# Patient Record
Sex: FEMALE | Race: WHITE | URBAN - METROPOLITAN AREA
[De-identification: names, ages, dates, MRNs, and addresses within clinical notes are randomized per-mention and may not be internally consistent; named-entity substitution may affect disease eponyms.]

---

## 2021-09-14 ENCOUNTER — EMERGENCY (EMERGENCY)
Facility: HOSPITAL | Age: 71
LOS: 0 days | Discharge: HOME | End: 2021-09-15
Attending: EMERGENCY MEDICINE | Admitting: STUDENT IN AN ORGANIZED HEALTH CARE EDUCATION/TRAINING PROGRAM
Payer: MEDICARE

## 2021-09-14 VITALS
SYSTOLIC BLOOD PRESSURE: 162 MMHG | WEIGHT: 104.06 LBS | OXYGEN SATURATION: 98 % | HEART RATE: 87 BPM | RESPIRATION RATE: 17 BRPM | DIASTOLIC BLOOD PRESSURE: 86 MMHG | TEMPERATURE: 98 F | HEIGHT: 64 IN

## 2021-09-14 DIAGNOSIS — I10 ESSENTIAL (PRIMARY) HYPERTENSION: ICD-10-CM

## 2021-09-14 DIAGNOSIS — E78.5 HYPERLIPIDEMIA, UNSPECIFIED: ICD-10-CM

## 2021-09-14 DIAGNOSIS — R94.31 ABNORMAL ELECTROCARDIOGRAM [ECG] [EKG]: ICD-10-CM

## 2021-09-14 DIAGNOSIS — I83.90 ASYMPTOMATIC VARICOSE VEINS OF UNSPECIFIED LOWER EXTREMITY: ICD-10-CM

## 2021-09-14 DIAGNOSIS — R07.89 OTHER CHEST PAIN: ICD-10-CM

## 2021-09-14 DIAGNOSIS — R07.9 CHEST PAIN, UNSPECIFIED: ICD-10-CM

## 2021-09-14 DIAGNOSIS — I49.8 OTHER SPECIFIED CARDIAC ARRHYTHMIAS: ICD-10-CM

## 2021-09-14 DIAGNOSIS — R06.02 SHORTNESS OF BREATH: ICD-10-CM

## 2021-09-14 DIAGNOSIS — Z20.822 CONTACT WITH AND (SUSPECTED) EXPOSURE TO COVID-19: ICD-10-CM

## 2021-09-14 LAB
ALBUMIN SERPL ELPH-MCNC: 3.5 G/DL — SIGNIFICANT CHANGE UP (ref 3.3–5)
ALP SERPL-CCNC: 102 U/L — SIGNIFICANT CHANGE UP (ref 40–120)
ALT FLD-CCNC: 32 U/L — SIGNIFICANT CHANGE UP (ref 12–78)
ANION GAP SERPL CALC-SCNC: 1 MMOL/L — LOW (ref 5–17)
AST SERPL-CCNC: 30 U/L — SIGNIFICANT CHANGE UP (ref 15–37)
BASOPHILS # BLD AUTO: 0.01 K/UL — SIGNIFICANT CHANGE UP (ref 0–0.2)
BASOPHILS NFR BLD AUTO: 0.2 % — SIGNIFICANT CHANGE UP (ref 0–2)
BILIRUB SERPL-MCNC: 0.3 MG/DL — SIGNIFICANT CHANGE UP (ref 0.2–1.2)
BUN SERPL-MCNC: 16 MG/DL — SIGNIFICANT CHANGE UP (ref 7–23)
CALCIUM SERPL-MCNC: 8.4 MG/DL — LOW (ref 8.5–10.1)
CHLORIDE SERPL-SCNC: 107 MMOL/L — SIGNIFICANT CHANGE UP (ref 96–108)
CHOLEST SERPL-MCNC: 168 MG/DL — SIGNIFICANT CHANGE UP
CO2 SERPL-SCNC: 30 MMOL/L — SIGNIFICANT CHANGE UP (ref 22–31)
CREAT SERPL-MCNC: 0.65 MG/DL — SIGNIFICANT CHANGE UP (ref 0.5–1.3)
D DIMER BLD IA.RAPID-MCNC: <150 NG/ML DDU — SIGNIFICANT CHANGE UP
EOSINOPHIL # BLD AUTO: 0.2 K/UL — SIGNIFICANT CHANGE UP (ref 0–0.5)
EOSINOPHIL NFR BLD AUTO: 4.4 % — SIGNIFICANT CHANGE UP (ref 0–6)
GLUCOSE SERPL-MCNC: 100 MG/DL — HIGH (ref 70–99)
HCT VFR BLD CALC: 44.4 % — SIGNIFICANT CHANGE UP (ref 34.5–45)
HDLC SERPL-MCNC: 63 MG/DL — SIGNIFICANT CHANGE UP
HGB BLD-MCNC: 14.1 G/DL — SIGNIFICANT CHANGE UP (ref 11.5–15.5)
IMM GRANULOCYTES NFR BLD AUTO: 0.4 % — SIGNIFICANT CHANGE UP (ref 0–1.5)
LIPID PNL WITH DIRECT LDL SERPL: 94 MG/DL — SIGNIFICANT CHANGE UP
LYMPHOCYTES # BLD AUTO: 1.81 K/UL — SIGNIFICANT CHANGE UP (ref 1–3.3)
LYMPHOCYTES # BLD AUTO: 40.2 % — SIGNIFICANT CHANGE UP (ref 13–44)
MCHC RBC-ENTMCNC: 28.7 PG — SIGNIFICANT CHANGE UP (ref 27–34)
MCHC RBC-ENTMCNC: 31.8 GM/DL — LOW (ref 32–36)
MCV RBC AUTO: 90.4 FL — SIGNIFICANT CHANGE UP (ref 80–100)
MONOCYTES # BLD AUTO: 0.28 K/UL — SIGNIFICANT CHANGE UP (ref 0–0.9)
MONOCYTES NFR BLD AUTO: 6.2 % — SIGNIFICANT CHANGE UP (ref 2–14)
NEUTROPHILS # BLD AUTO: 2.18 K/UL — SIGNIFICANT CHANGE UP (ref 1.8–7.4)
NEUTROPHILS NFR BLD AUTO: 48.6 % — SIGNIFICANT CHANGE UP (ref 43–77)
NON HDL CHOLESTEROL: 105 MG/DL — SIGNIFICANT CHANGE UP
NT-PROBNP SERPL-SCNC: 86 PG/ML — SIGNIFICANT CHANGE UP (ref 0–125)
PLATELET # BLD AUTO: 206 K/UL — SIGNIFICANT CHANGE UP (ref 150–400)
POTASSIUM SERPL-MCNC: 4.2 MMOL/L — SIGNIFICANT CHANGE UP (ref 3.5–5.3)
POTASSIUM SERPL-SCNC: 4.2 MMOL/L — SIGNIFICANT CHANGE UP (ref 3.5–5.3)
PROT SERPL-MCNC: 6.6 GM/DL — SIGNIFICANT CHANGE UP (ref 6–8.3)
RAPID RVP RESULT: SIGNIFICANT CHANGE UP
RBC # BLD: 4.91 M/UL — SIGNIFICANT CHANGE UP (ref 3.8–5.2)
RBC # FLD: 13.6 % — SIGNIFICANT CHANGE UP (ref 10.3–14.5)
SARS-COV-2 RNA SPEC QL NAA+PROBE: SIGNIFICANT CHANGE UP
SODIUM SERPL-SCNC: 138 MMOL/L — SIGNIFICANT CHANGE UP (ref 135–145)
TRIGL SERPL-MCNC: 56 MG/DL — SIGNIFICANT CHANGE UP
TROPONIN I SERPL-MCNC: <0.015 NG/ML — SIGNIFICANT CHANGE UP (ref 0.01–0.04)
TROPONIN I SERPL-MCNC: <0.015 NG/ML — SIGNIFICANT CHANGE UP (ref 0.01–0.04)
WBC # BLD: 4.5 K/UL — SIGNIFICANT CHANGE UP (ref 3.8–10.5)
WBC # FLD AUTO: 4.5 K/UL — SIGNIFICANT CHANGE UP (ref 3.8–10.5)

## 2021-09-14 PROCEDURE — 71045 X-RAY EXAM CHEST 1 VIEW: CPT | Mod: 26

## 2021-09-14 PROCEDURE — 80061 LIPID PANEL: CPT

## 2021-09-14 PROCEDURE — 83880 ASSAY OF NATRIURETIC PEPTIDE: CPT

## 2021-09-14 PROCEDURE — 83036 HEMOGLOBIN GLYCOSYLATED A1C: CPT

## 2021-09-14 PROCEDURE — 93005 ELECTROCARDIOGRAM TRACING: CPT

## 2021-09-14 PROCEDURE — 96374 THER/PROPH/DIAG INJ IV PUSH: CPT | Mod: XU

## 2021-09-14 PROCEDURE — G0378: CPT

## 2021-09-14 PROCEDURE — 0225U NFCT DS DNA&RNA 21 SARSCOV2: CPT

## 2021-09-14 PROCEDURE — 99285 EMERGENCY DEPT VISIT HI MDM: CPT | Mod: CS

## 2021-09-14 PROCEDURE — 71045 X-RAY EXAM CHEST 1 VIEW: CPT

## 2021-09-14 PROCEDURE — 78452 HT MUSCLE IMAGE SPECT MULT: CPT

## 2021-09-14 PROCEDURE — 93306 TTE W/DOPPLER COMPLETE: CPT | Mod: 26

## 2021-09-14 PROCEDURE — 80053 COMPREHEN METABOLIC PANEL: CPT

## 2021-09-14 PROCEDURE — 99284 EMERGENCY DEPT VISIT MOD MDM: CPT | Mod: 25

## 2021-09-14 PROCEDURE — 84443 ASSAY THYROID STIM HORMONE: CPT

## 2021-09-14 PROCEDURE — 84484 ASSAY OF TROPONIN QUANT: CPT

## 2021-09-14 PROCEDURE — 93306 TTE W/DOPPLER COMPLETE: CPT

## 2021-09-14 PROCEDURE — 86803 HEPATITIS C AB TEST: CPT

## 2021-09-14 PROCEDURE — 85379 FIBRIN DEGRADATION QUANT: CPT

## 2021-09-14 PROCEDURE — A9500: CPT

## 2021-09-14 PROCEDURE — 85025 COMPLETE CBC W/AUTO DIFF WBC: CPT

## 2021-09-14 PROCEDURE — 86769 SARS-COV-2 COVID-19 ANTIBODY: CPT

## 2021-09-14 PROCEDURE — 36415 COLL VENOUS BLD VENIPUNCTURE: CPT

## 2021-09-14 RX ORDER — INFLUENZA VIRUS VACCINE 15; 15; 15; 15 UG/.5ML; UG/.5ML; UG/.5ML; UG/.5ML
0.5 SUSPENSION INTRAMUSCULAR ONCE
Refills: 0 | Status: DISCONTINUED | OUTPATIENT
Start: 2021-09-14 | End: 2021-09-15

## 2021-09-14 RX ORDER — METOPROLOL TARTRATE 50 MG
25 TABLET ORAL EVERY 12 HOURS
Refills: 0 | Status: DISCONTINUED | OUTPATIENT
Start: 2021-09-14 | End: 2021-09-15

## 2021-09-14 RX ORDER — ENOXAPARIN SODIUM 100 MG/ML
40 INJECTION SUBCUTANEOUS DAILY
Refills: 0 | Status: DISCONTINUED | OUTPATIENT
Start: 2021-09-14 | End: 2021-09-15

## 2021-09-14 RX ORDER — ASPIRIN/CALCIUM CARB/MAGNESIUM 324 MG
81 TABLET ORAL DAILY
Refills: 0 | Status: DISCONTINUED | OUTPATIENT
Start: 2021-09-15 | End: 2021-09-15

## 2021-09-14 RX ORDER — ASPIRIN/CALCIUM CARB/MAGNESIUM 324 MG
325 TABLET ORAL ONCE
Refills: 0 | Status: COMPLETED | OUTPATIENT
Start: 2021-09-14 | End: 2021-09-14

## 2021-09-14 RX ORDER — ATORVASTATIN CALCIUM 80 MG/1
20 TABLET, FILM COATED ORAL AT BEDTIME
Refills: 0 | Status: DISCONTINUED | OUTPATIENT
Start: 2021-09-14 | End: 2021-09-15

## 2021-09-14 RX ADMIN — ENOXAPARIN SODIUM 40 MILLIGRAM(S): 100 INJECTION SUBCUTANEOUS at 11:05

## 2021-09-14 RX ADMIN — Medication 25 MILLIGRAM(S): at 21:52

## 2021-09-14 RX ADMIN — Medication 25 MILLIGRAM(S): at 11:05

## 2021-09-14 RX ADMIN — ATORVASTATIN CALCIUM 20 MILLIGRAM(S): 80 TABLET, FILM COATED ORAL at 21:52

## 2021-09-14 RX ADMIN — Medication 325 MILLIGRAM(S): at 04:54

## 2021-09-14 NOTE — H&P ADULT - HISTORY OF PRESENT ILLNESS
70/F with PMHX of HLD, varicose veins, was brought to the  ED for c/o sudden onset of left sided chest  pain that woke her from sleep. Chest pain felt like heavy pressure  pain started anteriorly and then wrapped around under left armpit, better now. Non tender.   She denies any previous episodes of cp.  Her last stress was over an yr ago  No known CAD/stents/MI./CVA/TIA. No c/o fever, HA, sob, n/v/d/abd pain.     Elevated SBP in , 162, 164    1st trop neg    D dimers neg

## 2021-09-14 NOTE — ED ADULT NURSE REASSESSMENT NOTE - NS ED NURSE REASSESS COMMENT FT1
pt aaox4, pt resting comfortably on stretcher, no s/sx of distress noted, vss, afebrile, nsr 60s on monitor, report given to Dewey RN, awaiting for transporter

## 2021-09-14 NOTE — PHARMACOTHERAPY INTERVENTION NOTE - COMMENTS
Medications and allergies have been reviewed and verified with the patient and in correspondence with Dr. Carson medication history profile.

## 2021-09-14 NOTE — ED PROVIDER NOTE - OBJECTIVE STATEMENT
71 y/o female in ED c/o sudden onset of left sided chest pressure pain that awoke her from sleep.   pt denies any previous episodes of cp.   states last stress over 1 yr ago at [facility].   pt denies any cardiac history.   pt denies any fever, HA, sob, n/v/d/abd pain.   pt states pain started anterior and then wrapped around to under armpit.

## 2021-09-14 NOTE — ED ADULT TRIAGE NOTE - CHIEF COMPLAINT QUOTE
Pt presents to the ED c/o L sided nonradiating chest pain that woke her up from her sleep. States she is having trouble taking a deep breath. Denies dizziness, nausea, vomiting, swelling to lower extremities. Sent in for stat ekg.

## 2021-09-14 NOTE — ED PROVIDER NOTE - PROGRESS NOTE DETAILS
results noted.   will admit for further cardiac w/u.   case d/w Dominga and will admit with bridge and SIMÓN

## 2021-09-14 NOTE — ED ADULT NURSE NOTE - OBJECTIVE STATEMENT
tylenol pt presents ambulatory to ER c/o l sided chest pressure/discomfort. non-radiating. no aggravating or alleviating factors. endorses hx HLD on lipitor, palpitations on propanolol PRN last took today. denies other cardiac history. spo2 >95% on room air. bedside cardiac monitor reading NSR with occasional PVCs. 20# IV placed to L AC. bloodwork drawn and sent to lab.

## 2021-09-14 NOTE — H&P ADULT - ASSESSMENT
70/F with PMHX of HLD, varicose veins, was brought to the  ED for c/o sudden onset of left sided chest  pain that woke her from sleep. Chest pain felt like heavy pressure  pain started anteriorly and then wrapped around under left armpit, better now. Non tender.   She denies any previous episodes of cp.  Her last stress was over an yr ago  No known CAD/stents/MI./CVA/TIA. No c/o fever, HA, sob, n/v/d/abd pain.     1st trop neg    D dimers neg    Pt admitted with     1) Chest Pain + abn EKG: neg d dimers  JOHANA  observe on tele  serial cardiac enz  asa daily  lipid panel  control BP   add BB  echo  cardio consult for stress test vs cath    2) New Onset HTN: , 162, 161  start metoprolol 25 mg bid, titrate  low sat diet    3) HLD: on statin    4) DVT PPX: lovenox    poc discussed with pt, team.

## 2021-09-14 NOTE — H&P ADULT - NSHPPHYSICALEXAM_GEN_ALL_CORE
PHYSICAL EXAM:    Daily Height in cm: 162.56 (14 Sep 2021 02:56)     Vital Signs Last 24 Hrs  T(C): 37.1 (14 Sep 2021 06:52), Max: 37.1 (14 Sep 2021 06:52)  T(F): 98.7 (14 Sep 2021 06:52), Max: 98.7 (14 Sep 2021 06:52)  HR: 74 (14 Sep 2021 08:44) (74 - 87)  BP: 164/88 (14 Sep 2021 08:44) (161/84 - 164/88)  BP(mean): 107 (14 Sep 2021 06:52) (107 - 107)  ABP: --  ABP(mean): --  RR: 18 (14 Sep 2021 08:44) (17 - 18)  SpO2: 100% (14 Sep 2021 08:44) (98% - 100%)      Constitutional: Well appearing  HEENT: Atraumatic, ROBBY, Normal, No congestion  Respiratory: Breath Sounds normal, no rhonchi/wheeze  Cardiovascular: N S1S2;  Gastrointestinal: Abdomen soft, non tender, Bowel Sounds present  Extremities: No edema, peripheral pulses present  Neurological: AAO x 3, no gross focal motor deficits  Skin: Non cellulitic, no rash, ulcers  Lymph Nodes: No lymphadenopathy noted  Back: No CVA tenderness   Musculoskeletal: non tender  Breasts: Deferred  Genitourinary: deferred  Rectal: Deferred

## 2021-09-15 ENCOUNTER — TRANSCRIPTION ENCOUNTER (OUTPATIENT)
Age: 71
End: 2021-09-15

## 2021-09-15 VITALS — SYSTOLIC BLOOD PRESSURE: 131 MMHG | HEART RATE: 84 BPM | DIASTOLIC BLOOD PRESSURE: 74 MMHG

## 2021-09-15 LAB
A1C WITH ESTIMATED AVERAGE GLUCOSE RESULT: 5.4 % — SIGNIFICANT CHANGE UP (ref 4–5.6)
COVID-19 SPIKE DOMAIN AB INTERP: POSITIVE
COVID-19 SPIKE DOMAIN ANTIBODY RESULT: >250 U/ML — HIGH
ESTIMATED AVERAGE GLUCOSE: 108 MG/DL — SIGNIFICANT CHANGE UP (ref 68–114)
HCV AB S/CO SERPL IA: 0.14 S/CO — SIGNIFICANT CHANGE UP (ref 0–0.99)
HCV AB SERPL-IMP: SIGNIFICANT CHANGE UP
SARS-COV-2 IGG+IGM SERPL QL IA: >250 U/ML — HIGH
SARS-COV-2 IGG+IGM SERPL QL IA: POSITIVE
TSH SERPL-MCNC: 2.02 UU/ML — SIGNIFICANT CHANGE UP (ref 0.34–4.82)

## 2021-09-15 PROCEDURE — 93016 CV STRESS TEST SUPVJ ONLY: CPT

## 2021-09-15 PROCEDURE — 93018 CV STRESS TEST I&R ONLY: CPT

## 2021-09-15 PROCEDURE — 78452 HT MUSCLE IMAGE SPECT MULT: CPT | Mod: 26

## 2021-09-15 RX ORDER — ASPIRIN/CALCIUM CARB/MAGNESIUM 324 MG
1 TABLET ORAL
Qty: 0 | Refills: 0 | DISCHARGE
Start: 2021-09-15

## 2021-09-15 RX ORDER — METOPROLOL TARTRATE 50 MG
1 TABLET ORAL
Qty: 60 | Refills: 0
Start: 2021-09-15 | End: 2021-10-14

## 2021-09-15 RX ORDER — ROSUVASTATIN CALCIUM 5 MG/1
1 TABLET ORAL
Qty: 0 | Refills: 0 | DISCHARGE

## 2021-09-15 RX ORDER — BNT162B2 0.23 MG/2.25ML
0.3 INJECTION, SUSPENSION INTRAMUSCULAR
Qty: 0 | Refills: 0 | DISCHARGE

## 2021-09-15 RX ORDER — PROPRANOLOL HCL 160 MG
1 CAPSULE, EXTENDED RELEASE 24HR ORAL
Qty: 0 | Refills: 0 | DISCHARGE

## 2021-09-15 RX ORDER — REGADENOSON 0.08 MG/ML
0.4 INJECTION, SOLUTION INTRAVENOUS ONCE
Refills: 0 | Status: COMPLETED | OUTPATIENT
Start: 2021-09-15 | End: 2021-09-15

## 2021-09-15 RX ORDER — METOPROLOL TARTRATE 50 MG
1 TABLET ORAL
Qty: 0 | Refills: 0 | DISCHARGE
Start: 2021-09-15

## 2021-09-15 RX ADMIN — ENOXAPARIN SODIUM 40 MILLIGRAM(S): 100 INJECTION SUBCUTANEOUS at 11:08

## 2021-09-15 RX ADMIN — Medication 81 MILLIGRAM(S): at 11:08

## 2021-09-15 RX ADMIN — REGADENOSON 0.4 MILLIGRAM(S): 0.08 INJECTION, SOLUTION INTRAVENOUS at 09:50

## 2021-09-15 RX ADMIN — Medication 25 MILLIGRAM(S): at 11:08

## 2021-09-15 NOTE — DISCHARGE NOTE PROVIDER - NSDCCPCAREPLAN_GEN_ALL_CORE_FT
PRINCIPAL DISCHARGE DIAGNOSIS  Diagnosis: Chest pain  Assessment and Plan of Treatment: # Atypical chest pain  Likely chest wall skeletal muscle spasm  At present chest pain free, no SOB   EKG wih T inversion   TTE - with preserved EF - wnl  trops neg x 2  BNP - wnl  D-Dimer - wnl  Nuclear stress test done on 9/15 - normal   cont. asa, statin, BB  follow up with cardiology next week         SECONDARY DISCHARGE DIAGNOSES  Diagnosis: HTN (hypertension)  Assessment and Plan of Treatment: #HTN/Hx of paroxysmal tachycardia  - started on metoprolol tartarate 25 mg po q 12 hrs

## 2021-09-15 NOTE — CONSULT NOTE ADULT - SUBJECTIVE AND OBJECTIVE BOX
Patient is a 70y old  Female who presents with a chief complaint of cp (14 Sep 2021 08:53)  9/15/21- Cardiology Consultation: Seen due to chest pain. 70 year old woman with HLD who awoke from sleep at about 2 AM on  with severe left lateral inspiratory chest pain and shortness of breath. Spouse drove her to ED and she was admitted to telemetry. She had performed heavy housework on . The pain ended spontaneously after several hours and has not recurred. No prior history of chest pain, MI, angina, HBP, DM. Stress echo perhaps last year was normal. History of episodic tachycardia for years treated with prn use of propranolol. No tobacco use, rare alcohol. No FH of CAD.    HPI:   70/F with PMHX of HLD, varicose veins, was brought to the  ED for c/o sudden onset of left sided chest  pain that woke her from sleep. Chest pain felt like heavy pressure  pain started anteriorly and then wrapped around under left armpit, better now. Non tender.   She denies any previous episodes of cp.  Her last stress was over an yr ago  No known CAD/stents/MI./CVA/TIA. No c/o fever, HA, sob, n/v/d/abd pain. Elevated SBP in , 162, 164    1st trop neg    D dimers neg (14 Sep 2021 08:53)      PAST MEDICAL & SURGICAL HISTORY:  No pertinent past medical history      Allergies and Intolerances:        Allergies:  	No Known Allergies:     Home Medications:   * Patient Currently Takes Medications as of 14-Sep-2021 08:58 documented in Structured Notes  · 	rosuvastatin 5 mg oral tablet: Last Dose Taken:  , 1 tab(s) orally once a day  · 	propranolol 10 mg oral tablet: Last Dose Taken:  , 1 tab(s) orally once a day, As Needed  · 	Pfizer-BioNTech COVID-19 Vaccine PF 30 mcg/0.3 mL intramuscular suspension: Last Dose Taken:  , 0.3 milliliter(s) intramuscular once    MEDICATIONS  (STANDING):  aspirin enteric coated 81 milliGRAM(s) Oral daily  atorvastatin 20 milliGRAM(s) Oral at bedtime  enoxaparin Injectable 40 milliGRAM(s) SubCutaneous daily  influenza   Vaccine 0.5 milliLiter(s) IntraMuscular once  metoprolol tartrate 25 milliGRAM(s) Oral every 12 hours    MEDICATIONS  (PRN):      FAMILY HISTORY:  No pertinent family history in first degree relatives        SOCIAL HISTORY:  Tobacco-           Alcohol-              Illicit drugs-              Occupation-              Marital  status-  REVIEW OF SYSTEM:  Pertinent items are noted in HPI.    Vital Signs Last 24 Hrs  T(C): 36.4 (14 Sep 2021 20:48), Max: 37 (14 Sep 2021 16:38)  T(F): 97.6 (14 Sep 2021 20:48), Max: 98.6 (14 Sep 2021 16:38)  HR: 74 (14 Sep 2021 20:48) (66 - 74)  BP: 134/75 (14 Sep 2021 20:48) (134/75 - 164/88)  BP(mean): 98 (14 Sep 2021 16:38) (98 - 98)  RR: 16 (14 Sep 2021 20:48) (16 - 18)  SpO2: 99% (14 Sep 2021 20:48) (99% - 100%)    I&O's Summary    PHYSICAL EXAM  General Appearance:   HEENT: PERRL, conjunctiva clear, EOM's intact, non injected pharynx, no exudate, TM   normal  Neck: Supple, , no adenopathy, thyroid: not enlarged, no carotid bruit or JVD  Back: Symmetric, no  tenderness,no soft tissue tenderness  Lungs: Clear to auscultation bilaterally,no adventitious breath sounds, normal   expiratory phase  Heart: Regular rate and rhythm, S1, S2 normal, no murmur, rub or gallop  Abdomen: Soft, non-tender, bowel sounds active , no hepatosplenomegaly  Extremities: no cyanosis or edema, no joint swelling  Skin: Skin color, texture normal, no rashes   Neurologic: Alert and oriented X3 , cranial nerves intact, sensory and motor normal,        INTERPRETATION OF TELEMETRY: RSR    EC/14- sinus 85 BPM, T inversion V1-3.   9/15- sinus 91, possible LVH         EXAM:  ECHO TTE WO CON COMP W DOPP         PROCEDURE DATE:  2021         Summary     The left ventricle is normal in size, wall thickness, wall motion and   contractility. Estimated left ventricular ejection fraction is 55%.   Mild diastolic dysfunction (stage I).   Mild tricuspid valve regurgitation.   Trace mitral regurgitation.     Signature     ----------------------------------------------------------------   Electronically signed by Ranjan Braden MD(Interpreting   physician) on 2021 01:22 PM   ----------------------------------------------------------------                LABS:                          14.1   4.50  )-----------( 206      ( 14 Sep 2021 03:16 )             44.4     14    138  |  107  |  16  ----------------------------<  100<H>  4.2   |  30  |  0.65    Ca    8.4<L>      14 Sep 2021 03:16    TPro  6.6  /  Alb  3.5  /  TBili  0.3  /  DBili  x   /  AST  30  /  ALT  32  /  AlkPhos  102  09-14    CARDIAC MARKERS ( 14 Sep 2021 07:41 )  <0.015 ng/mL / x     / x     / x     / x      CARDIAC MARKERS ( 14 Sep 2021 03:16 )  <0.015 ng/mL / x     / x     / x     / x          Lipid Panel  168  63  --  56    Pro BNP  86  @ 03:16  D Dimer  <150  @ 03:16              RADIOLOGY & ADDITIONAL STUDIES:    IMPRESSION:  1. Atypical chest pain. Likely chest wall skeletal muscle spasm. Given initial T inversion on EKG will obtain chemical stress MPI scan.  2.HLD- continue statin.  3.elevated BP on admission without prior history. Rule out hypertension vs white coat syndrome.  4. History of paroxysmal tachycardia.  PLAN:  Lexiscan nuclear stress test. If negative then discharge, if positive then coronary angiography. Continue statin, ASA, metoprolol. Observe BP.Will follow.

## 2021-09-15 NOTE — DISCHARGE NOTE PROVIDER - HOSPITAL COURSE
70/F with PMHX of HLD, varicose veins, was brought to the  ED for c/o sudden onset of left sided chest  pain that woke her from sleep. Chest pain felt like heavy pressure  pain started anteriorly and then wrapped around under left armpit, better now. Non tender.   She denies any previous episodes of cp.  Her last stress was over an yr ago  No known CAD/stents/MI./CVA/TIA. No c/o fever, HA, sob, n/v/d/abd pain.     # Atypical chest pain  Likely chest wall skeletal muscle spasm  At present chest pain free, no SOB   EKG wih T inversion   TTE - with preserved EF - wnl  trops neg x 2  BNP - wnl  D-Dimer - wnl  Nuclear stress test done on 9/15 - normal   cont. asa, statin, BB  follow up with cardiology next week     #HTN/Hx of paroxysmal tachycardia  - started on metoprolol tartarate 25 mg po q 12 hrs     # HLD- continue statin    Pt. is stable for discharge, will follow up with cardiology - Dr. Mcmahon and PCP in one week.    PHYSICAL EXAM  Vital Signs Last 24 Hrs  T(C): 36.9 (15 Sep 2021 08:15), Max: 37 (14 Sep 2021 16:38)  T(F): 98.5 (15 Sep 2021 08:15), Max: 98.6 (14 Sep 2021 16:38)  HR: 84 (15 Sep 2021 11:06) (66 - 84)  BP: 131/74 (15 Sep 2021 11:06) (131/74 - 147/80)  BP(mean): 98 (14 Sep 2021 16:38) (98 - 98)  RR: 17 (15 Sep 2021 08:15) (16 - 17)  SpO2: 99% (15 Sep 2021 08:15) (99% - 99%)  General Appearance:   HEENT: PERRL, conjunctiva clear, EOM's intact, non injected pharynx, no exudate, TM   normal  Neck: Supple, , no adenopathy, thyroid: not enlarged, no carotid bruit or JVD  Back: Symmetric, no  tenderness,no soft tissue tenderness  Lungs: Clear to auscultation bilaterally,no adventitious breath sounds, normal   expiratory phase  Heart: Regular rate and rhythm, S1, S2 normal, no murmur, rub or gallop  Abdomen: Soft, non-tender, bowel sounds active , no hepatosplenomegaly  Extremities: no cyanosis or edema, no joint swelling  Skin: Skin color, texture normal, no rashes   Neurologic: Alert and oriented X3 , cranial nerves intact, sensory and motor normal

## 2021-09-15 NOTE — DISCHARGE NOTE PROVIDER - CARE PROVIDER_API CALL
Danny Mcmahon  CARDIOVASCULAR DISEASE  [address] [place] [address]  Phone: [phone]  Fax: [phone]  Follow Up Time:     Jsoe Godoy)  Internal Medicine  [address] [place] [address]  Phone: [phone]  Fax: [phone]  Follow Up Time:

## 2021-09-15 NOTE — DISCHARGE NOTE PROVIDER - NSDCMRMEDTOKEN_GEN_ALL_CORE_FT
aspirin 81 mg oral delayed release tablet: 1 tab(s) orally once a day  metoprolol tartrate 25 mg oral tablet: 1 tab(s) orally every 12 hours  rosuvastatin 5 mg oral tablet: 1 tab(s) orally once a day

## 2021-09-15 NOTE — DISCHARGE NOTE NURSING/CASE MANAGEMENT/SOCIAL WORK - PATIENT PORTAL LINK FT
You can access the FollowMyHealth Patient Portal offered by [facility] by registering at the following website: [email or web address]. By joining [employer]’s FollowMyHealth portal, you will also be able to view your health information using other applications (apps) compatible with our system.

## 2022-06-20 NOTE — H&P ADULT - NSHPROSALLOTHERNEGRD_GEN_ALL_CORE
Add Variable For Additional Medical Justification: No Detail Level: Detailed Size Of Lesion In Cm (Required): 0.4 Medical Necessity Clause: This procedure was medically necessary because the lesion that was treated was: debulked prior to SRT All other review of systems negative, except as noted in HPI Wound Care: Petrolatum Post-Care Instructions: I reviewed with the patient in detail post-care instructions. Patient is to keep the biopsy site dry overnight, and then apply bacitracin twice daily until healed. Patient may apply hydrogen peroxide soaks to remove any crusting. Anesthesia Type: 1% lidocaine with epinephrine Consent was obtained from the patient. The risks and benefits to therapy were discussed in detail. Specifically, the risks of infection, scarring, bleeding, prolonged wound healing, incomplete removal, allergy to anesthesia, nerve injury and recurrence were addressed. Prior to the procedure, the treatment site was clearly identified and confirmed by the patient. All components of Universal Protocol/PAUSE Rule completed. Was A Bandage Applied: Yes Biopsy Method: Dermablade X Size Of Lesion In Cm (Optional): 0 Anesthesia Volume In Cc: 0.2 Notification Instructions: Patient will be notified of pathology results. However, patient instructed to call the office if not contacted within 2 weeks. Billing Type: Third-Party Bill Medical Necessity Information: It is in your best interest to select a reason for this procedure from the list below. All of these items fulfill various CMS LCD requirements except the new and changing color options. Hemostasis: Drysol Yes

## 2022-08-18 NOTE — H&P ADULT - NSHPLABSRESULTS_GEN_ALL_CORE
Lab Results:  CBC  CBC Full  -  ( 14 Sep 2021 03:16 )  WBC Count : 4.50 K/uL  RBC Count : 4.91 M/uL  Hemoglobin : 14.1 g/dL  Hematocrit : 44.4 %  Platelet Count - Automated : 206 K/uL  Mean Cell Volume : 90.4 fl  Mean Cell Hemoglobin : 28.7 pg  Mean Cell Hemoglobin Concentration : 31.8 gm/dL  Auto Neutrophil # : 2.18 K/uL  Auto Lymphocyte # : 1.81 K/uL  Auto Monocyte # : 0.28 K/uL  Auto Eosinophil # : 0.20 K/uL  Auto Basophil # : 0.01 K/uL  Auto Neutrophil % : 48.6 %  Auto Lymphocyte % : 40.2 %  Auto Monocyte % : 6.2 %  Auto Eosinophil % : 4.4 %  Auto Basophil % : 0.2 %    .		Differential:	[] Automated		[] Manual  Chemistry                        14.1   4.50  )-----------( 206      ( 14 Sep 2021 03:16 )             44.4     09-14    138  |  107  |  16  ----------------------------<  100<H>  4.2   |  30  |  0.65    Ca    8.4<L>      14 Sep 2021 03:16    TPro  6.6  /  Alb  3.5  /  TBili  0.3  /  DBili  x   /  AST  30  /  ALT  32  /  AlkPhos  102  09-14    LIVER FUNCTIONS - ( 14 Sep 2021 03:16 )  Alb: 3.5 g/dL / Pro: 6.6 gm/dL / ALK PHOS: 102 U/L / ALT: 32 U/L / AST: 30 U/L / GGT: x           ekg: nsr ; inverted T in V2-3; no EKG to compare to    MEDICATIONS  (STANDING):  atorvastatin 20 milliGRAM(s) Oral at bedtime  enoxaparin Injectable 40 milliGRAM(s) SubCutaneous daily  influenza   Vaccine 0.5 milliLiter(s) IntraMuscular once  metoprolol tartrate 25 milliGRAM(s) Oral every 12 hours Labs within last 12 months All labs not within last 12 months, ordered Labs within last 12 months

## 2025-07-18 NOTE — DISCHARGE NOTE NURSING/CASE MANAGEMENT/SOCIAL WORK - NSDCPEFALRISK_GEN_ALL_CORE
Please advise   For information on Fall & injury Prevention, visit [email or web address]